# Patient Record
(demographics unavailable — no encounter records)

---

## 2024-11-06 NOTE — ASSESSMENT
[FreeTextEntry1] : Ms. SHENG DIAS is a 63 year old female with past medical history significant for Asthma, HTN, Hx of DVT, HLD Reports recent hospitalization for pneumonia and bacteremia, was placed on IV abx for several weeks. feels pain to LEFT back since then and still has cough productive of scanty sputum. Patient on Rituxan.   Was previously started on Trelegy (100mcg/62.5mcg/25 mcg) 1 puff Daily for management of Asthma.  PFT -  moderate-severe Obstructive airway disease.  On exam, lungs are clear, no wheezing or rhonchi, in NAD. Pain to LEFT back with deep inhalation.   Care plans discussed - will order for sputum for AFB/Culture/Fungal, CT Chest to assess pain/pna. Continue Trelegy (100mcg/62.5mcg/25 mcg) 1 puff Daily for management of Asthma. Follow-up in 2 weeks.

## 2024-11-06 NOTE — REVIEW OF SYSTEMS
[Cough] : cough [Negative] : Heme/Lymph [FreeTextEntry6] : +productive cough [FreeTextEntry9] : _upper back pain when coughing

## 2024-11-06 NOTE — HISTORY OF PRESENT ILLNESS
[de-identified] : Ms. SHENG DIAS is a 63-year-old female with hx. 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 Patient's son is speaking on behalf through telephone due to language barrier. Patient was hospitalized at [facility] in [place] on 10/31 for flu-like sxs. She was diagnosed and treated for pneumonia sepsis and was on IV antibiotic for 21 days with some improvements. She finished abx course 2 weeks ago.  She feels better now but still c/o persistent phlegm and upper back pain when coughing. Does not take any OTC medication for cough. She still uses fluticasone inhaler prn with some improvements. Pt. also c/o pain in LT underarm that radiates to her chest. She has not yet followed up with a pulmonologist. Recently f/u with nephrologist on 10/31 who noted she still has proteinuria, which remains at 2 grams. Her kidney function is stable, no longer anemic and her thyroid function is improving, which led to the decrease in levothyroxine dosage from 150mg to 125mg. All other medication doses remain unchanged. She has been started on Gazyva IV infusion and is no longer on Rituxan 100mg infusion. Denies any fever/chill, SOB, palpitations, headaches, and dizziness. She is otherwise fine and offers no other complaints.

## 2024-11-06 NOTE — HISTORY OF PRESENT ILLNESS
[TextBox_4] : Ms. SHENG DIAS is a 63 year old female with past medical history significant for Asthma, HTN, Hx of DVT, HLD

## 2024-11-06 NOTE — PLAN
[FreeTextEntry1] : See plan.  S/p pneumonia sepsis  treated  chest xray ordered   Asthma cont. advair 250-50 mcg 1 puff BID cont albuterol 108 90 base mcg inhale 2 puffs every 4 hrs PRN pulmonary medicine referral  Htn cont Losartan 50 mg QD  Hld cont Atorvastatin 40 mg QHS  Hypothyroidism cont Levothyroxine 150 mcg QD  Membranous nephropathy cont Eliquis 5 mg BID  Membranous glomerulonephritis Bloodwork ordered.

## 2024-11-06 NOTE — PHYSICAL EXAM
[No Acute Distress] : no acute distress [Normal Oropharynx] : normal oropharynx [Normal Appearance] : normal appearance [Normal Rate/Rhythm] : normal rate/rhythm [No Resp Distress] : no resp distress [Clear to Auscultation Bilaterally] : clear to auscultation bilaterally [No Edema] : no edema [Normal Color/ Pigmentation] : normal color/ pigmentation

## 2024-11-20 NOTE — HEALTH RISK ASSESSMENT
[Good] : ~his/her~  mood as  good [No] : No [Little interest or pleasure doing things] : 1) Little interest or pleasure doing things [Feeling down, depressed, or hopeless] : 2) Feeling down, depressed, or hopeless [0] : 2) Feeling down, depressed, or hopeless: Not at all (0) [PHQ-2 Negative - No further assessment needed] : PHQ-2 Negative - No further assessment needed [Never] : Never [Patient reported PAP Smear was normal] : Patient reported PAP Smear was normal [Patient reported colonoscopy was abnormal] : Patient reported colonoscopy was abnormal [With Family] : lives with family [# of Members in Household ___] :  household currently consist of [unfilled] member(s) [Unemployed] : unemployed [High School] : high school [] :  [# Of Children ___] : has [unfilled] children [Feels Safe at Home] : Feels safe at home [No falls in past year] : Patient reported no falls in the past year [de-identified] : Maintains active by walking. [de-identified] : Maintains a healthy diet.  [[id number]] : 0 [MammogramComments] : Pt. requests a referral.  [MammogramDate] : 09/2022 [PapSmearDate] : 2022 [BoneDensityDate] : Never [BoneDensityComments] : Pt. requests a referral.  [ColonoscopyDate] : 12/2022

## 2024-11-20 NOTE — HISTORY OF PRESENT ILLNESS
[de-identified] : Ms. SHENG DIAS is a 63-year-old female with hx. 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 She was diagnosed and treated for pneumonia sepsis and was on IV antibiotic for 21 days with some improvements. She finished abx course 2 weeks ago.  Still has mild cough.  She is following with pulmonologist, has an appointment today. She is following with nephrologist for membranous nephropathy.   She has been started on Gazyva IV infusion and is no longer on Rituxan 100mg infusion. Denies any fever/chill, SOB, palpitations, headaches, and dizziness. She is otherwise fine and offers no other complaints.

## 2024-11-20 NOTE — HISTORY OF PRESENT ILLNESS
[de-identified] : Ms. SHENG DIAS is a 63-year-old female with hx. 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 She was diagnosed and treated for pneumonia sepsis and was on IV antibiotic for 21 days with some improvements. She finished abx course 2 weeks ago.  Still has mild cough.  She is following with pulmonologist, has an appointment today. She is following with nephrologist for membranous nephropathy.   She has been started on Gazyva IV infusion and is no longer on Rituxan 100mg infusion. Denies any fever/chill, SOB, palpitations, headaches, and dizziness. She is otherwise fine and offers no other complaints.

## 2024-11-20 NOTE — REVIEW OF SYSTEMS
[Negative] : Heme/Lymph [FreeTextEntry6] : +productive cough [FreeTextEntry9] : _upper back pain when coughing

## 2024-11-20 NOTE — HEALTH RISK ASSESSMENT
[Good] : ~his/her~  mood as  good [No] : No [Little interest or pleasure doing things] : 1) Little interest or pleasure doing things [Feeling down, depressed, or hopeless] : 2) Feeling down, depressed, or hopeless [0] : 2) Feeling down, depressed, or hopeless: Not at all (0) [PHQ-2 Negative - No further assessment needed] : PHQ-2 Negative - No further assessment needed [Never] : Never [Patient reported PAP Smear was normal] : Patient reported PAP Smear was normal [Patient reported colonoscopy was abnormal] : Patient reported colonoscopy was abnormal [With Family] : lives with family [# of Members in Household ___] :  household currently consist of [unfilled] member(s) [Unemployed] : unemployed [High School] : high school [] :  [# Of Children ___] : has [unfilled] children [Feels Safe at Home] : Feels safe at home [No falls in past year] : Patient reported no falls in the past year [de-identified] : Maintains active by walking. [de-identified] : Maintains a healthy diet.  [[id number]] : 0 [MammogramComments] : Pt. requests a referral.  [MammogramDate] : 09/2022 [PapSmearDate] : 2022 [BoneDensityDate] : Never [BoneDensityComments] : Pt. requests a referral.  [ColonoscopyDate] : 12/2022

## 2024-11-20 NOTE — HISTORY OF PRESENT ILLNESS
[de-identified] : Ms. SHENG DIAS is a 63-year-old female with hx. 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 She was diagnosed and treated for pneumonia sepsis and was on IV antibiotic for 21 days with some improvements. She finished abx course 2 weeks ago.  Still has mild cough.  She is following with pulmonologist, has an appointment today. She is following with nephrologist for membranous nephropathy.   She has been started on Gazyva IV infusion and is no longer on Rituxan 100mg infusion. Denies any fever/chill, SOB, palpitations, headaches, and dizziness. She is otherwise fine and offers no other complaints.

## 2024-11-20 NOTE — ADDENDUM
[FreeTextEntry1] : I, Jaida Teague, acted as a scribe on behalf of Dr. Stacey Garcia MD, on 11/20/2024.   All medical entries made by the scribe were at my, Dr. Stacey Garcia MD, direction and personally dictated by me on 11/20/2024. I have reviewed the chart and agree that the record accurately reflects my personal performance of the history, physical exam, assessment and plan. I have also personally directed, reviewed, and agreed with the chart.

## 2024-11-20 NOTE — ASSESSMENT
[FreeTextEntry1] : 63 year old female with past medical history significant for Asthma, HTN, Hx of DVT, HLD  recent hospitalization for pneumonia and bacteremia, was placed on IV abx for several weeks. During last visit - felt pain to LEFT back since then and still has cough productive of scanty sputum. Patient on Rituxan.  Was previously started on Trelegy (100mcg/62.5mcg/25 mcg) 1 puff Daily for management of Asthma.   Reports LEFT pain now resolved. Has not brought in sputum samples yet.   PFT -  moderate-severe Obstructive airway disease. CT Chest - Mild to moderate multi-focal sub-segmental atelectasis and or scarring. Multiple bilateral indeterminant pulmonary nodules up to 5mm  - Nov 2024    On exam, lungs are clear, no wheezing or rhonchi, in NAD.   Care plans discussed - pending sputum for AFB/Culture/Fungal, CT Chest in 1 year. Continue Trelegy (100mcg/62.5mcg/25 mcg) 1 puff Daily for management of Asthma.  Follow-up in  3 months.

## 2024-11-20 NOTE — PHYSICAL EXAM
[No Acute Distress] : no acute distress [Well Nourished] : well nourished [Well Developed] : well developed [Well-Appearing] : well-appearing [Normal Sclera/Conjunctiva] : normal sclera/conjunctiva [PERRL] : pupils equal round and reactive to light [EOMI] : extraocular movements intact [Normal Outer Ear/Nose] : the outer ears and nose were normal in appearance [Normal Oropharynx] : the oropharynx was normal [No JVD] : no jugular venous distention [No Lymphadenopathy] : no lymphadenopathy [Supple] : supple [Thyroid Normal, No Nodules] : the thyroid was normal and there were no nodules present [No Respiratory Distress] : no respiratory distress  [No Accessory Muscle Use] : no accessory muscle use [Clear to Auscultation] : lungs were clear to auscultation bilaterally [Normal Rate] : normal rate  [Regular Rhythm] : with a regular rhythm [Normal S1, S2] : normal S1 and S2 [No Murmur] : no murmur heard [No Carotid Bruits] : no carotid bruits [Pedal Pulses Present] : the pedal pulses are present [No Edema] : there was no peripheral edema [Soft] : abdomen soft [Non Tender] : non-tender [Non-distended] : non-distended [No Masses] : no abdominal mass palpated [Normal Bowel Sounds] : normal bowel sounds [Normal Posterior Cervical Nodes] : no posterior cervical lymphadenopathy [Normal Anterior Cervical Nodes] : no anterior cervical lymphadenopathy [No CVA Tenderness] : no CVA  tenderness [No Spinal Tenderness] : no spinal tenderness [No Joint Swelling] : no joint swelling [Grossly Normal Strength/Tone] : grossly normal strength/tone [No Rash] : no rash [Coordination Grossly Intact] : coordination grossly intact [No Focal Deficits] : no focal deficits [Normal Gait] : normal gait [Normal Affect] : the affect was normal [Normal Insight/Judgement] : insight and judgment were intact

## 2024-11-20 NOTE — PLAN
[FreeTextEntry1] : Annual physical recent blood work reviewed see plan  Asthma cont. advair 250-50 mcg 1 puff BID cont albuterol 108 90 base mcg inhale 2 puffs every 4 hrs PRN pulmonary follow up  Htn cont Losartan 50 mg QD  Hld cont Atorvastatin 40 mg QHS  Hypothyroidism cont Levothyroxine 150 mcg QD  Membranous nephropathy cont Eliquis 5 mg BID following with nephrologist

## 2024-11-20 NOTE — PHYSICAL EXAM
[Normal Oropharynx] : normal oropharynx [No Acute Distress] : no acute distress [Normal Appearance] : normal appearance [Normal Rate/Rhythm] : normal rate/rhythm [No Resp Distress] : no resp distress [Clear to Auscultation Bilaterally] : clear to auscultation bilaterally [No Edema] : no edema [Normal Color/ Pigmentation] : normal color/ pigmentation

## 2024-11-20 NOTE — HEALTH RISK ASSESSMENT
[Good] : ~his/her~  mood as  good [No] : No [Little interest or pleasure doing things] : 1) Little interest or pleasure doing things [Feeling down, depressed, or hopeless] : 2) Feeling down, depressed, or hopeless [0] : 2) Feeling down, depressed, or hopeless: Not at all (0) [PHQ-2 Negative - No further assessment needed] : PHQ-2 Negative - No further assessment needed [Never] : Never [Patient reported PAP Smear was normal] : Patient reported PAP Smear was normal [Patient reported colonoscopy was abnormal] : Patient reported colonoscopy was abnormal [With Family] : lives with family [# of Members in Household ___] :  household currently consist of [unfilled] member(s) [Unemployed] : unemployed [High School] : high school [] :  [# Of Children ___] : has [unfilled] children [Feels Safe at Home] : Feels safe at home [No falls in past year] : Patient reported no falls in the past year [de-identified] : Maintains active by walking. [de-identified] : Maintains a healthy diet.  [[id number]] : 0 [MammogramDate] : 09/2022 [MammogramComments] : Pt. requests a referral.  [PapSmearDate] : 2022 [BoneDensityDate] : Never [BoneDensityComments] : Pt. requests a referral.  [ColonoscopyDate] : 12/2022

## 2024-11-20 NOTE — HISTORY OF PRESENT ILLNESS
[TextBox_4] : 63 year old female with past medical history significant for Asthma, HTN, Hx of DVT, HLD

## 2024-12-19 NOTE — DISCUSSION/SUMMARY
[FreeTextEntry1] : The patient is a 63-year-old Kuwaiti speaking female HTN, HLD, Asthma, MARIBELL , membranous glomerulonephropathy whose BP is optimal #1 CV- ECHO overall normal LV function #2 HTN- c/w losartan at 50mg alone #3 HLD- c/w atorvastatin, needs fasting lipid panel and consider fenofibrate. #4 Hypothyroid- c/w levothyroxine #5 Renal- resolving MARIBELL, spironolactone 25mg, getting rituxin for GN, f/u renal #6 Pulm- on inhaler and steroids, c/w Eliquis to 2.5mg bid  #7 Gyn- s/p DEVANG, s/p resection benign right ovarian mass #8 Heme- anemia and thrombocytosis

## 2025-02-26 NOTE — REASON FOR VISIT
Urticaria x 2 weeks - improving  No other sx  -Allergy Referral  -May try OTC Allegra  -Alarm signs / ER precautions and call precautions reviewed   [Follow-Up] : a follow-up visit [Pneumonia] : pneumonia [Cough] : cough

## 2025-02-26 NOTE — ASSESSMENT
[FreeTextEntry1] : 63 year old female with past medical history significant for Asthma, HTN, Hx of DVT, HLD Was previously started on Trelegy (100mcg/62.5mcg/25 mcg) 1 puff Daily for management of Asthma which she uses several times a week with good relief. States she had to go to [facility] for an emergency since last November and was unable to follow-up during that time. Within that time sputum sample grew + MEGHNA x1.   PFT -  moderate-severe Obstructive airway disease. CT Chest - Mild to moderate multi-focal sub-segmental atelectasis and or scarring. Multiple bilateral indeterminant pulmonary nodules up to 5mm  - Nov 2024  Sputum - +AFB, MEGHNA x 1 - Nov 2024    On exam, lungs are clear, no wheezing or rhonchi, in NAD.   Care plans discussed - ordered for additional AFB sputum. CT Chest in 1 year. Continue Trelegy (100mcg/62.5mcg/25 mcg) 1 puff Daily for management of Asthma.  Follow-up in  3 months.

## 2025-02-27 NOTE — PHYSICAL EXAM
[No Acute Distress] : no acute distress [Well Nourished] : well nourished [Well Developed] : well developed [Well-Appearing] : well-appearing [Normal Sclera/Conjunctiva] : normal sclera/conjunctiva [Normal Outer Ear/Nose] : the outer ears and nose were normal in appearance [Normal Oropharynx] : the oropharynx was normal [No JVD] : no jugular venous distention [No Lymphadenopathy] : no lymphadenopathy [Supple] : supple [Thyroid Normal, No Nodules] : the thyroid was normal and there were no nodules present [No Respiratory Distress] : no respiratory distress  [No Accessory Muscle Use] : no accessory muscle use [Clear to Auscultation] : lungs were clear to auscultation bilaterally [Normal Rate] : normal rate  [Regular Rhythm] : with a regular rhythm [Normal S1, S2] : normal S1 and S2 [Pedal Pulses Present] : the pedal pulses are present [No Edema] : there was no peripheral edema [No Extremity Clubbing/Cyanosis] : no extremity clubbing/cyanosis [Soft] : abdomen soft [Non Tender] : non-tender [Non-distended] : non-distended [Normal Posterior Cervical Nodes] : no posterior cervical lymphadenopathy [Normal Anterior Cervical Nodes] : no anterior cervical lymphadenopathy [No CVA Tenderness] : no CVA  tenderness [No Spinal Tenderness] : no spinal tenderness [No Joint Swelling] : no joint swelling [Grossly Normal Strength/Tone] : grossly normal strength/tone [No Rash] : no rash [Coordination Grossly Intact] : coordination grossly intact [No Focal Deficits] : no focal deficits [Normal Gait] : normal gait [Normal Affect] : the affect was normal [Normal Insight/Judgement] : insight and judgment were intact [EOMI] : extraocular movements intact [Normal TMs] : both tympanic membranes were normal

## 2025-02-27 NOTE — HISTORY OF PRESENT ILLNESS
[de-identified] : Ms. 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 Pt is Zambian speaking.   Pt states she is feeling well. She follows with Dr. Beth for asthma and reports her asthma is stable. Her last colonoscopy was in 2022 and was told to repeat yearly d/t polyps.  Pt c/o joint pain in b/l hands and feet.  She is following with nephrologist regularly for membranous glomerulonephritis. Denies any SOB, CP, abdominal pain, N/V/D, headache, dizziness, or leg swelling. Reports compliance with taking her meds daily.

## 2025-02-27 NOTE — ADDENDUM
[FreeTextEntry1] : Documented by Marylu Méndez acting as a scribe for Dr. Garcia. 02/26/2025   All medical record entries made by the scribe were at my, Dr. Garcia, direction and personally dictated by me on 02/26/2025. I have reviewed the chart an agree that the record accurately reflects my personal performance of the history, physical exam, assessment and plan. I have also personally directed, reviewed, and agreed with the chart.

## 2025-02-27 NOTE — HISTORY OF PRESENT ILLNESS
[de-identified] : Ms. 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 Pt is Cape Verdean speaking.   Pt states she is feeling well. She follows with Dr. Bteh for asthma and reports her asthma is stable. Her last colonoscopy was in 2022 and was told to repeat yearly d/t polyps.  Pt c/o joint pain in b/l hands and feet.  She is following with nephrologist regularly for membranous glomerulonephritis. Denies any SOB, CP, abdominal pain, N/V/D, headache, dizziness, or leg swelling. Reports compliance with taking her meds daily.

## 2025-02-27 NOTE — PLAN
[FreeTextEntry1] : Follow up   Health Maintenance Referred for mammogram   Asthma  Referred to pulmonology for follow up   arthralgia b/l hands and feet  We'll check arthralgia panel   HTN cont Losartan 50 mg QD Reinforced the importance of following a low sodium diet.   HLD cont Atorvastatin 40 mg QHS reinforced the importance of following a low cholesterol/low fat diet. we'll check lipids and LFT's today  Hypothyroidism cont Levothyroxine 150 mcg QD we'll check TSH/T4 today  Membranous nephropathy cont Eliquis 5 mg BID following with nephrologist. will monitor renal function  Bloodwork ordered. Follow up in one week for lab results.  Follow up in 3 months

## 2025-03-11 NOTE — PLAN
[FreeTextEntry1] :  A/P: 63 year F for WWE - s/p DEVANG, pap no longer needed - Urinary symptoms, recommended to f/u with Urologist

## 2025-03-11 NOTE — HISTORY OF PRESENT ILLNESS
[FreeTextEntry1] :  63 year F presents for annual visit. c/o urinary frequency and difficulty holding urine.   +Sexually active  LMP:   OBHx: ,  x 3 GYNHx:  DEVANG,  RA-BSO   Health Maintenance: Last Pap -  Mammo -  DEXA - Colonoscopy -

## 2025-05-15 NOTE — REVIEW OF SYSTEMS
[Abdominal Pain] : no abdominal pain [Nausea] : no nausea [Constipation] : constipation [Vomiting] : no vomiting [Heartburn] : no heartburn [Melena] : no melena [Joint Pain] : joint pain [Joint Stiffness] : joint stiffness [Negative] : Heme/Lymph

## 2025-05-15 NOTE — HISTORY OF PRESENT ILLNESS
[de-identified] : Ms. 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 Pt is Azerbaijani speaking.   Pt states she is feeling well. She follows with Dr. Beth for asthma and reports her asthma is stable.  She is constipated, denies any blood in the stool, melena, abdominal pain.  Her last colonoscopy was in 2022 and was told to repeat yearly d/t polyps.  Pt c/o joint pain in b/l hands associated with stiffness, arthralgia panel was unremarkable She is following with nephrologist regularly for membranous glomerulonephritis.  She is on Eliquis Denies any SOB, CP, abdominal pain, N/V/D, headache, dizziness, or leg swelling. Reports compliance with taking her meds daily.

## 2025-05-15 NOTE — PLAN
[FreeTextEntry1] : see plan  Asthma  Referred to pulmonology for follow up   arthralgia b/l hands and feet   arthralgia panel unremarkable Rheumatology referral Tylenol as needed  HTN cont Losartan 50 mg QD Reinforced the importance of following a low sodium diet.   HLD cont Atorvastatin 40 mg QHS reinforced the importance of following a low cholesterol/low fat diet. we'll check lipids and LFT's today  Hypothyroidism cont Levothyroxine 150 mcg QD we'll check TSH/T4 today  Membranous nephropathy cont Eliquis 5 mg BID following with nephrologist. will monitor renal function  Bloodwork ordered. Follow up in one week for lab results.  Follow up in 3 months

## 2025-05-15 NOTE — PHYSICAL EXAM
[No Acute Distress] : no acute distress [Well Nourished] : well nourished [Well Developed] : well developed [Well-Appearing] : well-appearing [Normal Sclera/Conjunctiva] : normal sclera/conjunctiva [EOMI] : extraocular movements intact [Normal Outer Ear/Nose] : the outer ears and nose were normal in appearance [Normal Oropharynx] : the oropharynx was normal [Normal TMs] : both tympanic membranes were normal [No JVD] : no jugular venous distention [No Lymphadenopathy] : no lymphadenopathy [Supple] : supple [Thyroid Normal, No Nodules] : the thyroid was normal and there were no nodules present [No Respiratory Distress] : no respiratory distress  [No Accessory Muscle Use] : no accessory muscle use [Clear to Auscultation] : lungs were clear to auscultation bilaterally [Normal Rate] : normal rate  [Regular Rhythm] : with a regular rhythm [Normal S1, S2] : normal S1 and S2 [Pedal Pulses Present] : the pedal pulses are present [No Edema] : there was no peripheral edema [No Extremity Clubbing/Cyanosis] : no extremity clubbing/cyanosis [Soft] : abdomen soft [Non Tender] : non-tender [Non-distended] : non-distended [Normal Posterior Cervical Nodes] : no posterior cervical lymphadenopathy [Normal Anterior Cervical Nodes] : no anterior cervical lymphadenopathy [No CVA Tenderness] : no CVA  tenderness [No Spinal Tenderness] : no spinal tenderness [No Joint Swelling] : no joint swelling [Grossly Normal Strength/Tone] : grossly normal strength/tone [No Rash] : no rash [Coordination Grossly Intact] : coordination grossly intact [No Focal Deficits] : no focal deficits [Normal Gait] : normal gait [Normal Affect] : the affect was normal [Normal Insight/Judgement] : insight and judgment were intact